# Patient Record
Sex: FEMALE | Race: OTHER | Employment: FULL TIME | URBAN - METROPOLITAN AREA
[De-identification: names, ages, dates, MRNs, and addresses within clinical notes are randomized per-mention and may not be internally consistent; named-entity substitution may affect disease eponyms.]

---

## 2024-04-07 ENCOUNTER — APPOINTMENT (OUTPATIENT)
Dept: GENERAL RADIOLOGY | Age: 54
End: 2024-04-07
Payer: COMMERCIAL

## 2024-04-07 ENCOUNTER — APPOINTMENT (OUTPATIENT)
Dept: CT IMAGING | Age: 54
End: 2024-04-07
Payer: COMMERCIAL

## 2024-04-07 ENCOUNTER — HOSPITAL ENCOUNTER (EMERGENCY)
Age: 54
Discharge: HOME | End: 2024-04-07
Payer: COMMERCIAL

## 2024-04-07 VITALS
OXYGEN SATURATION: 98 % | RESPIRATION RATE: 18 BRPM | HEIGHT: 64 IN | BODY MASS INDEX: 26.46 KG/M2 | DIASTOLIC BLOOD PRESSURE: 96 MMHG | TEMPERATURE: 98.3 F | SYSTOLIC BLOOD PRESSURE: 145 MMHG | HEART RATE: 80 BPM | WEIGHT: 155 LBS

## 2024-04-07 DIAGNOSIS — M54.50 LOW BACK PAIN, UNSPECIFIED BACK PAIN LATERALITY, UNSPECIFIED CHRONICITY, UNSPECIFIED WHETHER SCIATICA PRESENT: ICD-10-CM

## 2024-04-07 DIAGNOSIS — S52.121A CLOSED DISPLACED FRACTURE OF HEAD OF RIGHT RADIUS, INITIAL ENCOUNTER: Primary | ICD-10-CM

## 2024-04-07 PROCEDURE — 73080 X-RAY EXAM OF ELBOW: CPT

## 2024-04-07 PROCEDURE — 73090 X-RAY EXAM OF FOREARM: CPT

## 2024-04-07 PROCEDURE — 6370000000 HC RX 637 (ALT 250 FOR IP): Performed by: PHYSICIAN ASSISTANT

## 2024-04-07 PROCEDURE — 72110 X-RAY EXAM L-2 SPINE 4/>VWS: CPT

## 2024-04-07 PROCEDURE — 72131 CT LUMBAR SPINE W/O DYE: CPT

## 2024-04-07 PROCEDURE — 99284 EMERGENCY DEPT VISIT MOD MDM: CPT

## 2024-04-07 RX ORDER — HYDROCODONE BITARTRATE AND ACETAMINOPHEN 5; 325 MG/1; MG/1
1 TABLET ORAL ONCE
Status: COMPLETED | OUTPATIENT
Start: 2024-04-07 | End: 2024-04-07

## 2024-04-07 RX ORDER — HYDROCODONE BITARTRATE AND ACETAMINOPHEN 5; 325 MG/1; MG/1
1 TABLET ORAL EVERY 8 HOURS PRN
Qty: 6 TABLET | Refills: 0 | Status: SHIPPED | OUTPATIENT
Start: 2024-04-07 | End: 2024-04-09

## 2024-04-07 RX ADMIN — HYDROCODONE BITARTRATE AND ACETAMINOPHEN 1 TABLET: 5; 325 TABLET ORAL at 15:55

## 2024-04-07 ASSESSMENT — PAIN DESCRIPTION - ONSET: ONSET: PROGRESSIVE

## 2024-04-07 ASSESSMENT — PAIN DESCRIPTION - PAIN TYPE: TYPE: ACUTE PAIN

## 2024-04-07 ASSESSMENT — PAIN SCALES - GENERAL: PAINLEVEL_OUTOF10: 9

## 2024-04-07 ASSESSMENT — PAIN DESCRIPTION - LOCATION: LOCATION: ARM

## 2024-04-07 ASSESSMENT — LIFESTYLE VARIABLES
HOW MANY STANDARD DRINKS CONTAINING ALCOHOL DO YOU HAVE ON A TYPICAL DAY: PATIENT DOES NOT DRINK
HOW OFTEN DO YOU HAVE A DRINK CONTAINING ALCOHOL: NEVER

## 2024-04-07 ASSESSMENT — PAIN - FUNCTIONAL ASSESSMENT: PAIN_FUNCTIONAL_ASSESSMENT: 0-10

## 2024-04-07 ASSESSMENT — PAIN DESCRIPTION - ORIENTATION: ORIENTATION: RIGHT

## 2024-04-07 ASSESSMENT — PAIN DESCRIPTION - FREQUENCY: FREQUENCY: CONTINUOUS

## 2024-04-07 NOTE — ED PROVIDER NOTES
[facility] ED  eMERGENCY dEPARTMENT eNCOUnter      Pt Name: Yenny Avalos  MRN: [id number]  Birthdate 1970  Date of evaluation: 4/7/2024  Provider: Kristina Elizondo PA-C        HISTORY OF PRESENT ILLNESS    Yenny Avalos is a 53 y.o. female otherwise healthy; presenting to the ED for acute right elbow and forearm pain and left sided back pain that began yesterday after she fell while roller skating. Pain with movement of arm. Took motrin earlier. Apparently, was roller skating and fell, injured her arm and then fell while trying to get up and injured her back. Denies loss of bowel or bladder control, saddle anesthesia, BLE pain/weakness/paresthesia, foot drop bilaterally, neck pain, hitting her head, LOC, use of anticoagulants, cp, sob, abdominal pain, n/v/d, urinary sxs, parethesia or weakness of the right upper extremity.         REVIEW OF SYSTEMS       Review of Systems   Constitutional:  Negative for fever.   Respiratory:  Negative for shortness of breath.    Cardiovascular:  Negative for chest pain.   Gastrointestinal:  Negative for abdominal pain, nausea and vomiting.   Genitourinary: Negative.    Musculoskeletal:  Positive for arthralgias and back pain. Negative for neck pain.   Skin: Negative.    Neurological:  Negative for weakness and numbness.   All other systems reviewed and are negative.      Except as noted above the remainder of the review of systems was reviewed and negative.       PAST MEDICAL HISTORY   History reviewed. No pertinent past medical history.      SURGICAL HISTORY     History reviewed. No pertinent surgical history.      CURRENT MEDICATIONS       Previous Medications    No medications on file       ALLERGIES     Patient has no known allergies.    FAMILY HISTORY     History reviewed. No pertinent family history.       SOCIAL HISTORY       Social History     Tobacco Use    Smoking status: Never    Smokeless tobacco: Never   Vaping Use    Vaping Use: Every day    Substances:

## 2024-04-10 ENCOUNTER — OFFICE VISIT (OUTPATIENT)
Dept: ORTHOPEDIC SURGERY | Age: 54
End: 2024-04-10
Payer: COMMERCIAL

## 2024-04-10 VITALS — WEIGHT: 155 LBS | BODY MASS INDEX: 26.46 KG/M2 | HEIGHT: 64 IN | TEMPERATURE: 97.7 F

## 2024-04-10 DIAGNOSIS — S52.121A CLOSED DISPLACED FRACTURE OF HEAD OF RIGHT RADIUS, INITIAL ENCOUNTER: Primary | ICD-10-CM

## 2024-04-10 DIAGNOSIS — Z09 HOSPITAL DISCHARGE FOLLOW-UP: ICD-10-CM

## 2024-04-10 PROCEDURE — 1111F DSCHRG MED/CURRENT MED MERGE: CPT | Performed by: PHYSICIAN ASSISTANT

## 2024-04-10 PROCEDURE — 24650 CLTX RDL HEAD/NCK FX WO MNPJ: CPT | Performed by: PHYSICIAN ASSISTANT

## 2024-04-10 PROCEDURE — 99214 OFFICE O/P EST MOD 30 MIN: CPT | Performed by: PHYSICIAN ASSISTANT

## 2024-04-10 RX ORDER — HYDROCODONE BITARTRATE AND ACETAMINOPHEN 5; 325 MG/1; MG/1
1 TABLET ORAL EVERY 8 HOURS PRN
Qty: 21 TABLET | Refills: 0 | Status: SHIPPED | OUTPATIENT
Start: 2024-04-10 | End: 2024-04-17

## 2024-04-10 RX ORDER — HYDROCODONE BITARTRATE AND ACETAMINOPHEN 5; 325 MG/1; MG/1
1 TABLET ORAL EVERY 6 HOURS PRN
COMMUNITY
End: 2024-04-10 | Stop reason: SDUPTHER

## 2024-04-10 RX ORDER — CYCLOBENZAPRINE HCL 10 MG
10 TABLET ORAL 2 TIMES DAILY PRN
Qty: 20 TABLET | Refills: 0 | Status: SHIPPED | OUTPATIENT
Start: 2024-04-10 | End: 2024-04-20

## 2024-04-10 ASSESSMENT — ENCOUNTER SYMPTOMS
GASTROINTESTINAL NEGATIVE: 1
RESPIRATORY NEGATIVE: 1
EYES NEGATIVE: 1

## 2024-04-10 NOTE — PROGRESS NOTES
Yenny Avalos (:  1970) is a 53 y.o. female,New patient, here for evaluation of the following chief complaint(s):  Follow-Up from Hospital (4.6.24 fell with skating/States having pain in the right arm and lower left back pain)         ASSESSMENT/PLAN:  1. Closed displaced fracture of head of right radius, initial encounter  -     HYDROcodone-acetaminophen (NORCO) 5-325 MG per tablet; Take 1 tablet by mouth every 8 hours as needed for Pain for up to 7 days. Max Daily Amount: 3 tablets, Disp-21 tablet, R-0Normal  2. Hospital discharge follow-up  -     TN DISCHARGE MEDS RECONCILED W/ CURRENT OUTPATIENT MED LIST      Return in 1 week (on 2024).         Subjective   SUBJECTIVE/OBJECTIVE:  Is a 53-year-old right-hand-dominant female complaining of right elbow pain.  She states 3 days prior she was on roller skates and fell landing on her outstretched right arm.  X-rays in the emergency department showed a minimally displaced fracture of the radial head.  Patient is also complaining of lower back pain.  She denies bowel or bladder dysfunction.  She denies any radiation of the pain down the legs.        Review of Systems   Constitutional: Negative.    HENT: Negative.     Eyes: Negative.    Respiratory: Negative.     Gastrointestinal: Negative.    Genitourinary: Negative.    Musculoskeletal: Negative.    Psychiatric/Behavioral: Negative.            Objective   EXAMINATION:  FOUR XRAY VIEWS OF THE RIGHT ELBOW; THREE XRAY VIEWS RIGHT FOREARM     2024 4:03 pm     COMPARISON:  None.     HISTORY:  ORDERING SYSTEM PROVIDED HISTORY: right elbow pain  TECHNOLOGIST PROVIDED HISTORY:  Reason for exam:->right elbow pain  What reading provider will be dictating this exam?->CRC     FINDINGS:  Right elbow: Acute, mildly displaced intra-articular fracture of the radial  head and neck.  Associated displacement of the distal humeral fat pads  compatible with joint hemarthrosis.  Intact ulna and distal right

## 2024-04-17 ENCOUNTER — HOSPITAL ENCOUNTER (OUTPATIENT)
Dept: ORTHOPEDIC SURGERY | Age: 54
Discharge: HOME | End: 2024-04-19
Payer: COMMERCIAL

## 2024-04-17 ENCOUNTER — OFFICE VISIT (OUTPATIENT)
Dept: ORTHOPEDIC SURGERY | Age: 54
End: 2024-04-17

## 2024-04-17 DIAGNOSIS — S52.121D CLOSED DISPLACED FRACTURE OF HEAD OF RIGHT RADIUS WITH ROUTINE HEALING, SUBSEQUENT ENCOUNTER: ICD-10-CM

## 2024-04-17 DIAGNOSIS — S52.121D CLOSED DISPLACED FRACTURE OF HEAD OF RIGHT RADIUS WITH ROUTINE HEALING, SUBSEQUENT ENCOUNTER: Primary | ICD-10-CM

## 2024-04-17 PROCEDURE — 73080 X-RAY EXAM OF ELBOW: CPT

## 2024-04-17 PROCEDURE — 99024 POSTOP FOLLOW-UP VISIT: CPT | Performed by: PHYSICIAN ASSISTANT

## 2024-04-17 ASSESSMENT — ENCOUNTER SYMPTOMS
RESPIRATORY NEGATIVE: 1
GASTROINTESTINAL NEGATIVE: 1
EYES NEGATIVE: 1

## 2024-04-17 NOTE — PROGRESS NOTES
Yenny Avalos (:  1970) is a 53 y.o. female,Established patient, here for evaluation of the following chief complaint(s):  Cast Removal, Fracture, and Elbow Pain (Patient fell April 10, 2024 fracturing her right radial head.  She has been in a long-arm cast or splint since her injury date.  She presents today for repeat x-rays)      Assessment & Plan   1. Closed displaced fracture of head of right radius with routine healing, subsequent encounter  -     XR ELBOW RIGHT (MIN 3 VIEWS); Future  -     Ambulatory referral to Physical Therapy      Return in about 3 weeks (around 2024).       Subjective   This is a 53-year-old right-hand-dominant female following up for complaint of right elbow pain after a fall sustained 10 days ago.  She has a minimally displaced fracture of the radial head.  She was in a long-arm cast.  She states her pain is improving however she still has difficulty moving her arm secondary to pain.  She denies change in sensation of the hand or the arm.        Review of Systems   Constitutional: Negative.    HENT: Negative.     Eyes: Negative.    Respiratory: Negative.     Gastrointestinal: Negative.    Genitourinary: Negative.    Musculoskeletal: Negative.    Psychiatric/Behavioral: Negative.            Objective   Physical Exam  Musculoskeletal:      Comments: Right elbow-soft tissue swelling.  No break in the skin.  Extension is lacking 10 degrees, flexion to 120 degrees.  The elbow joint is stable, there is no laxity with radial or ulnar stress.  Pronation and supination range of motion is minimally decreased.  Improving tenderness with palpation directly over the radial head.  Sensation is intact distally to light touch.     I explained to the patient the x-rays show no migration of the radial head fracture.  I like her to come out of the cast and just continue in the sling.  Did like her to come out of the sling and work on range of motion exercises.  We discussed pronating and

## 2024-04-23 DIAGNOSIS — S52.121D CLOSED DISPLACED FRACTURE OF HEAD OF RIGHT RADIUS WITH ROUTINE HEALING, SUBSEQUENT ENCOUNTER: Primary | ICD-10-CM

## 2024-04-26 ENCOUNTER — HOSPITAL ENCOUNTER (OUTPATIENT)
Dept: OCCUPATIONAL THERAPY | Age: 54
Setting detail: THERAPIES SERIES
Discharge: HOME | End: 2024-04-26
Payer: COMMERCIAL

## 2024-04-26 PROCEDURE — 97166 OT EVAL MOD COMPLEX 45 MIN: CPT

## 2024-04-26 NOTE — THERAPY EVALUATION
[facility]   [facility] [address]  Dept: [phone]  Dept Fax: [phone]  Loc: [phone]    OCCUPATIONAL THERAPY EVALUATION    Occupational Therapy: Initial evaluation    Patient: Yenny Avalos (53 y.o. female)   Evaluation Date: 2024   :  1970  MRN: [id number]  CSN: [id number]  Account #: [id number]   Insurance: Payor: [facility] / Plan: [facility] MEDICAID / Product Type: *No Product type* /   Insurance ID: [id number] - (Medicaid Managed) Secondary Insurance (if applicable):    Referring Physician: Cristobal Thompson PA     PCP: Chad Crum MD  REFERRAL DATE: 24  Onset Date: 2024   Visits to Date: Total # of Visits to Date: 1  Visits Approved:  (eval only)    Medical Diagnosis: Closed displaced fracture of head of right radius with routine healing, subsequent encounter [S52.121D] Closed displaced fracture of head of right radius     Treating diagnosis: R29.898 Other symptoms and signs involving the musculoskeletal systems (decreased AROM, dexterity, and strength )       Therapy Time    Time in 1045   Time out 1130   Total treatment minutes 45   Total time code minutes         Session started when pt arrived late     OT Eval mod complexity 45 minutes for 1 unit, CPT 45850    PERTINENT MEDICAL HISTORY     PMH:  There is no problem list on file for this patient.    No past medical history on file.  No past surgical history on file.  No Known Allergies    Diagnostic imaging: Physician interpretation of imaging tests reviewed.    Medications:  No current outpatient medications on file.    Restrictions: none reported, per PA note on 4/10/24 \"Will begin gentle range of motion with no weights at her next visit.\"              SUBJECTIVE EXAMINATION     Patient's date of birth confirmed: Yes     Transfer of pt care required: no, pt able to work with ALANIZ or other OTR     Support contact: SO    English

## 2024-05-01 ENCOUNTER — HOSPITAL ENCOUNTER (OUTPATIENT)
Dept: OCCUPATIONAL THERAPY | Age: 54
Setting detail: THERAPIES SERIES
Discharge: HOME | End: 2024-05-01
Payer: COMMERCIAL

## 2024-05-01 PROCEDURE — 97530 THERAPEUTIC ACTIVITIES: CPT

## 2024-05-01 PROCEDURE — 97140 MANUAL THERAPY 1/> REGIONS: CPT

## 2024-05-01 NOTE — PROGRESS NOTES
[facility]CC[facility]ER[facility]       Occupational Therapy: Daily Note   Patient: Yenny Avalos (53 y.o. female)   Date: 2024  Plan of Care Certification Period:  24   :  1970  MRN: [id number]  CSN: [id number]   Insurance: Payor: [facility] / Plan: [facility] MEDICAID / Product Type: *No Product type* /   Insurance ID: [id number] - (Medicaid Managed) Secondary Insurance (if applicable):    Referring Physician: Cristobal Thompson PA     PCP: Chad Crum MD Visits to Date: Total # of Visits to Date: 1 (plus 1 eval)   Progress note:Progress Note Counter:   Visits Approved:  (eval only)    No Show: 0  Cancelled Appts:0     Medical Diagnosis: Closed displaced fracture of head of right radius with routine healing, subsequent encounter [S58.212U] Closed displaced fracture of head of right radius        Therapy Time     Time in 1100   Time out 1200   Total treatment minutes 60   Total time code minutes  60 Minutes        OT Manual therapy 10 minutes for 1 unit(s), CPT 79172  OT Therapeutic activities 50 minutes for 3 unit(s), CPT 94602       SUBJECTIVE EXAMINATION     Patient's date of birth confirmed: Yes     Pain Level:   Pt denies pain  Location: R elbow   Description: \"no numbness or tingling\"     Patient Comments:   \"We got my car fixed, so I shouldn't be late anymore.\"          Learning/Language barrier: no,        HEP/Strategies/Orthosis Compliance: N/A          Restrictions: none reported, per PA note on 4/10/24 \"Will begin gentle range of motion with no weights at her next visit.\"     Pt reported she did not call her PA to follow up. Therapist did not find any additional notes or clinical documentation clarifying restrictions. Will follow up before next visit.            OBJECTIVE EXAMINATION     TREATMENT     Focus of treatment was on the following:   decreasing fascial/soft tissue restrictions and increase right UE active ROM     MFR/Manual:   Pt treated seated on chair for

## 2024-05-08 ENCOUNTER — TELEPHONE (OUTPATIENT)
Dept: ORTHOPEDIC SURGERY | Age: 54
End: 2024-05-08

## 2024-05-08 ENCOUNTER — HOSPITAL ENCOUNTER (OUTPATIENT)
Dept: OCCUPATIONAL THERAPY | Age: 54
Setting detail: THERAPIES SERIES
Discharge: HOME | End: 2024-05-08
Payer: COMMERCIAL

## 2024-05-08 PROCEDURE — 97530 THERAPEUTIC ACTIVITIES: CPT

## 2024-05-08 NOTE — PROGRESS NOTES
[facility]       Occupational Therapy: Daily Note   Patient: Yenny Avalos (53 y.o. female)   Date: 2024  Plan of Care Certification Period:  24   :  1970  MRN: [id number]  CSN: [id number]   Insurance: Payor: [facility] / [facility] [place] [facility] / Product Type: *No Product type* /   Insurance ID: [id number] - (Medicaid Managed) Secondary Insurance (if applicable):    Referring Physician: Cristobal Thompson PA     PCP: Chad Crum MD Visits to Date: Total # of Visits to Date: 2 (plus 1 eval)   Progress note:Progress Note Counter:   Visits Approved:  (eval only)    No Show: 1  Cancelled Appts:0     Medical Diagnosis: Closed displaced fracture of head of right radius with routine healing, subsequent encounter [S52.533D] Closed displaced fracture of head of right radius        Therapy Time     Time in 1103   Time out 1156   Total treatment minutes 53   Total time code minutes  53 Minutes        OT Therapeutic activities 53 minutes for 4 unit(s), CPT 25558       SUBJECTIVE EXAMINATION     Patient's date of birth confirmed: Yes     Pain Level:   Pt denies pain  Location: n/a  Description: n/a  Comments: Pt reported only having achy pain when attempting supination. Pain is in dorsal and volar aspect of wrist. Pain radiates on radial side of forearm.      Patient Comments:   \"I have an appointment with my doctor tomorrow.\" Pt stated she is ready to go back to work.          Learning/Language barrier: no       HEP/Strategies/Orthosis Compliance: Patient verbally confirmed compliant with HEP's Pt stated has not attempted dry heat at home.           Restrictions: Pt stated she does not think that she has any restrictions. Per PA note on 4/10/24 \"Will begin gentle range of motion with no weights at her next visit.\" Pt stated has not contacted physician office to clarify, but will ask tomorrow at appointment.            OBJECTIVE EXAMINATION         TREATMENT     Focus

## 2024-05-09 ENCOUNTER — OFFICE VISIT (OUTPATIENT)
Dept: ORTHOPEDIC SURGERY | Age: 54
End: 2024-05-09

## 2024-05-09 VITALS
HEIGHT: 64 IN | WEIGHT: 180 LBS | TEMPERATURE: 97.4 F | HEART RATE: 89 BPM | OXYGEN SATURATION: 99 % | BODY MASS INDEX: 30.73 KG/M2

## 2024-05-09 DIAGNOSIS — S52.121D CLOSED DISPLACED FRACTURE OF HEAD OF RIGHT RADIUS WITH ROUTINE HEALING, SUBSEQUENT ENCOUNTER: Primary | ICD-10-CM

## 2024-05-09 PROCEDURE — 99024 POSTOP FOLLOW-UP VISIT: CPT | Performed by: PHYSICIAN ASSISTANT

## 2024-05-09 RX ORDER — HYDROCODONE BITARTRATE AND ACETAMINOPHEN 5; 325 MG/1; MG/1
1 TABLET ORAL 2 TIMES DAILY PRN
Qty: 14 TABLET | Refills: 0 | Status: SHIPPED | OUTPATIENT
Start: 2024-05-09 | End: 2024-05-16

## 2024-05-09 ASSESSMENT — ENCOUNTER SYMPTOMS
RESPIRATORY NEGATIVE: 1
EYES NEGATIVE: 1
GASTROINTESTINAL NEGATIVE: 1

## 2024-05-09 NOTE — PROGRESS NOTES
Yenny Avalos (:  1970) is a 53 y.o. female,Established patient, here for evaluation of the following chief complaint(s):  Follow-up (Follow up of right elbow fracture. DOI: 4.6.24 No pain at rest. 6/10 at its worst.)      Assessment & Plan   1. Closed displaced fracture of head of right radius with routine healing, subsequent encounter      No follow-ups on file.       Subjective   Is a 53-year-old right-hand-dominant female following up for complaint of right elbow pain secondary to radial head fracture.  She is 1 month status post fracture date.  She has attended 3 visits to occupational therapy.  She notices decreased range of motion.  She states she has been taking her pain medication sparingly but needs them to advance her movement while in therapy.  She denies change in sensation of the right hand.  She does not feel capable to return to work at this time.        Review of Systems   Constitutional: Negative.    HENT: Negative.     Eyes: Negative.    Respiratory: Negative.     Gastrointestinal: Negative.    Genitourinary: Negative.    Musculoskeletal: Negative.    Psychiatric/Behavioral: Negative.            Objective   Physical Exam  Constitutional:       Appearance: Normal appearance.   HENT:      Head: Normocephalic and atraumatic.      Mouth/Throat:      Mouth: Mucous membranes are moist.   Eyes:      Extraocular Movements: Extraocular movements intact.   Musculoskeletal:      Cervical back: Normal range of motion.      Comments: Right elbow-extension is lacking 10 degrees, flexion is 115 degrees.  Little decrease in pronation and supination of the right wrist.  Radial head is nontender with palpation.  Sensation is intact distally to light touch.   Skin:     General: Skin is warm and dry.   Neurological:      General: No focal deficit present.      Mental Status: She is oriented to person, place, and time.   Psychiatric:         Mood and Affect: Mood normal.     Explained to the patient that

## 2024-05-10 ENCOUNTER — HOSPITAL ENCOUNTER (OUTPATIENT)
Dept: OCCUPATIONAL THERAPY | Age: 54
Setting detail: THERAPIES SERIES
Discharge: HOME | End: 2024-05-10
Payer: COMMERCIAL

## 2024-05-10 PROCEDURE — 97530 THERAPEUTIC ACTIVITIES: CPT

## 2024-05-10 PROCEDURE — 97140 MANUAL THERAPY 1/> REGIONS: CPT

## 2024-05-10 NOTE — PROGRESS NOTES
[facility]       Occupational Therapy: Daily Note   Patient: Yenny Avalos (53 y.o. female)   Date: 05/10/2024  Plan of Care Certification Period:  24   :  1970  MRN: [id number]  CSN: [id number]   Insurance: Payor: [facility] / Plan: [facility] MEDICAID / Product Type: *No Product type* /   Insurance ID: [id number] - (Medicaid Managed) Secondary Insurance (if applicable):    Referring Physician: Cristobal Thompson PA     PCP: Chad Crum MD Visits to Date: Total # of Visits to Date: 3 (plus 1 eval)   Progress note:Progress Note Counter: 3/12  Visits Approved:  (eval only)    No Show: 1  Cancelled Appts:0     Medical Diagnosis: Closed displaced fracture of head of right radius with routine healing, subsequent encounter [S50.616W] Closed displaced fracture of head of right radius        Therapy Time     Time in 1032   Time out 1125   Total treatment minutes 53   Total time code minutes  53 Minutes        OT Manual therapy 8 minutes for 1 unit(s), CPT 17271  OT Therapeutic activities 45 minutes for 3 unit(s), CPT 11321       SUBJECTIVE EXAMINATION     Patient's date of birth confirmed: Yes     Pain Level:   Pt denies pain at rest   Location: n/a  Description: n/a    Patient Comments:   Pt stated would like to work on elbow extension and supination on this date.          Learning/Language barrier: no       HEP/Strategies/Orthosis Compliance: Patient verbally confirmed compliant with HEP's, \"becomes a little painful after awhile.\"           Restrictions: Per message from Wei Thompson on 24,  \"Ms. Avalos can begin little more aggressive range of motion exercises.  Her fracture is stable enough...\"           OBJECTIVE EXAMINATION         TREATMENT     Focus of treatment was on the following:   decreasing fascial/soft tissue restrictions, decreasing pain, education/training, and increase right UE active ROM     MFR/Manual:   Pt treated seated on chair  Upper extremity:

## 2024-05-22 ENCOUNTER — HOSPITAL ENCOUNTER (OUTPATIENT)
Dept: OCCUPATIONAL THERAPY | Age: 54
Setting detail: THERAPIES SERIES
Discharge: HOME | End: 2024-05-22
Payer: COMMERCIAL

## 2024-05-22 PROCEDURE — 97140 MANUAL THERAPY 1/> REGIONS: CPT

## 2024-05-22 PROCEDURE — 97530 THERAPEUTIC ACTIVITIES: CPT

## 2024-05-22 NOTE — PROGRESS NOTES
[facility]       Occupational Therapy: Daily Note   Patient: Yenny Avalos (53 y.o. female)   Date: 2024  Plan of Care Certification Period:  24   :  1970  MRN: [id number]  CSN: [id number]   Insurance: Payor: [facility] / [facility] [place] [facility] / Product Type: *No Product type* /   Insurance ID: [id number] - (Medicaid Managed) Secondary Insurance (if applicable):    Referring Physician: Cristobal Thompson PA     PCP: Chad Crum MD Visits to Date: Total # of Visits to Date: 4 (plus 1 eval)   Progress note:Progress Note Counter:   Visits Approved:  (eval only)    No Show: 1  Cancelled Appts:0     Medical Diagnosis: Closed displaced fracture of head of right radius with routine healing, subsequent encounter [S57.721T] Closed displaced fracture of head of right radius        Therapy Time     Time in 1100   Time out 1200   Total treatment minutes 60   Total time code minutes  60 Minutes        OT Manual therapy 10 minutes for 1 unit(s), CPT 99885  OT Therapeutic activities 50 minutes for 3 unit(s), CPT 60502       SUBJECTIVE EXAMINATION     Patient's date of birth confirmed: Yes     Pain Level:   Pt denies pain  Location: R posterior elbow radiating up the upper arm   Description: sore    Patient Comments:   \"I didn't have therapy last week.\"        Learning/Language barrier: no,        HEP/Strategies/Orthosis Compliance: Patient verbally confirmed compliant with HEP's Patient demo understanding. Pt reports the stretches sometimes makes her arm feel more stiff and sore.        Restrictions: Per message from Wei Thompson on 24,  \"Ms. Avalos can begin little more aggressive range of motion exercises.  Her fracture is stable enough...\"            OBJECTIVE EXAMINATION       TREATMENT     Focus of treatment was on the following:  Improving R elbow and forearm function, decreasing fascial/soft tissue restrictions and pain.      MFR/Manual:  Pt treated seated

## 2024-05-24 ENCOUNTER — HOSPITAL ENCOUNTER (OUTPATIENT)
Dept: OCCUPATIONAL THERAPY | Age: 54
Setting detail: THERAPIES SERIES
Discharge: HOME | End: 2024-05-24
Payer: COMMERCIAL

## 2024-05-24 PROCEDURE — 97530 THERAPEUTIC ACTIVITIES: CPT

## 2024-05-24 NOTE — PROGRESS NOTES
[facility]       Occupational Therapy: Daily Note   Patient: Yenny Avalos (53 y.o. female)   Date: 2024  Plan of Care Certification Period:      :  1970  MRN: [id number]  CSN: [id number]   Insurance: Payor: [facility] / [facility] [place] [facility] / Product Type: *No Product type* /   Insurance ID: [id number] - (Medicaid Managed) Secondary Insurance (if applicable):    Referring Physician: Cristobal Thompson PA     PCP: Chad Crum MD Visits to Date:   Total # of visits to date: 5 (plus 1 eval)  Progress note:    Visits Approved:    12 (eval only)  No Show:  1  Cancelled Appts:   0   Medical Diagnosis: Closed displaced fracture of head of right radius with routine healing, subsequent encounter [S55.766N] Closed displaced fracture of head of right radius        Therapy Time ***    Time in     Time out     Total treatment minutes     Total time code minutes           {time:36464}       SUBJECTIVE EXAMINATION     Patient's date of birth confirmed: {Birthday Verify:[birth year]}     Pain Level:   {paina:40132}  Location: ***  Description: ***    Patient Comments:   ***         Learning/Language barrier: no,        HEP/Strategies/Orthosis Compliance: {ptr:62532}          Restrictions: Per message from Wei Thompson on 24,  \"Ms. Avalos can begin little more aggressive range of motion exercises.  Her fracture is stable enough...\"            OBJECTIVE EXAMINATION         TREATMENT     Focus of treatment was on the following:   {focusoftreatment:63214}     MFR/Manual:  Pt treated seated on chair for soft tissue mobilization on the entire R forearm, focusing on the interosseous membrane to improve supination ROM followed by cross friction massage to the posterior elbow.      Exercises/Activities:  AROM forearm stretches with distal UE supported on ball on table x 10 reps:  -elbow flexion/extension with hold in extension x 5 sec  -elbow extended, forearm supination/pronation with 5 
  Wrist              Flexion 4/5 70° NT 0-70 5/5 80* NT   Extension  4/5 60° NT 0-60 5/5 55* NT   Ulnar deviation 3+/5 WFL 30 0-30 NT NT NT   Radial Deviation  3/5 WFL 5 0-20 NT NT NT   Comments: sharp pain w/ supination test. 75* supination achieved after 20 minutes of fluidotherapy and -10* elbow extension achieved after 20 minutes of fluidotherapy.    Edema  Circumferential measurements cm or inches     Right Eval Right 5/24 Left   Wrist (across styloid) 6 1/2\" 7 1/2\" 6 1/4\"    Metacarpal Phalangeal 7 5/8\" 7 3/4\" 7 1/4\"      & Pinch Strength  Average of 3 tries Right Eval Right 5/24 Norm    (lb) 20 52.7 Female age 50-54: 53 lbs   [place] Pinch (lb) 6.5 12.8 Female age 50-54: 11.5 lbs   Lateral Pinch (lb) 11.6 16.7 Female age 50-54: 12.0 lbs   Comments:     Coordination & Dexterity    Right Eval Right 5/24 Norm   Nine Hole Peg Test  (seconds) 25.1 19.01 Female age 50-54: 18.0 s   Comments:     Patient Education/HEP:   Continue recommended HEP/activities.         ASSESSMENT       Assessment:  Pt tolerated treatment well. Pt has met strength and dexterity goals and is progressing towards pain and mobility goals. Pt will benefit from continued OT to increase progress towards goals to increase participation in daily activities.          Post Treatment Pain: Pt denies pain    Patient's Activity Tolerance: Pt tolerated treatment well                 GOALS         Long Term Goals  Long Term Goal 1: Patient will report pain 4/10 or less during functional activities.  Long Term Goal 2: Patient  will be IND with all recommended HEP's, adaptive strategies, and adaptive techniques.  Long Term Goal 3: Patient will report decreased frequency of numbness/tingling in RUE.  Long Term Goal 4: Patient  will decrease edema in R hand to increase  performance with I/ADL's.  Long Term Goal 5: Patient will increase AROM of R elbow from current by 10-15° to increase performance with I/ADL's  Long Term Goal 6: Patient  will increase

## 2024-05-24 NOTE — PLAN OF CARE
Cristobal Thompson PA        Physician Comments: _______________________________________________    Please sign and return to [facility].  Please fax to the location listed below. THANK YOU for this referral!          If you have any questions or concerns, please don't hesitate to call.  Thank you for your referral!

## 2024-05-29 ENCOUNTER — HOSPITAL ENCOUNTER (OUTPATIENT)
Dept: OCCUPATIONAL THERAPY | Age: 54
Setting detail: THERAPIES SERIES
Discharge: HOME | End: 2024-05-29
Payer: COMMERCIAL

## 2024-05-29 PROCEDURE — 97530 THERAPEUTIC ACTIVITIES: CPT

## 2024-05-29 NOTE — PROGRESS NOTES
[facility]       Occupational Therapy: Daily Note   Patient: Yenny Avalos (53 y.o. female)   Date: 2024  Plan of Care Certification Period:  24   :  1970  MRN: [id number]  CSN: [id number]   Insurance: Payor: [facility] / [facility] [place] [facility] / Product Type: *No Product type* /   Insurance ID: [id number] - (Medicaid Managed) Secondary Insurance (if applicable):    Referring Physician: Cristobal Thompson PA     PCP: Chad Crum MD Visits to Date: Total # of Visits to Date: 6 (plus 1 eval)   Progress note:Progress Note Counter:   Visits Approved: 12    No Show: 1  Cancelled Appts:0     Medical Diagnosis: Closed displaced fracture of head of right radius with routine healing, subsequent encounter [S52.121D] Closed displaced fracture of head of right radius        Therapy Time     Time in 1100   Time out 1200   Total treatment minutes 60   Total time code minutes  60 Minutes        OT Therapeutic activities 60 minutes for 4 unit(s), CPT 03969       SUBJECTIVE EXAMINATION     Patient's date of birth confirmed: Yes     Pain Level:   Pt denies pain  Location: R elbow   Description: stiff    Patient Comments:   \"I can brush my hair, and clean myself after I use the bathroom.\"          Learning/Language barrier: no,        HEP/Strategies/Orthosis Compliance: Patient verbally confirmed compliant with HEP's          Restrictions: Per OTR Wm, referring provider messaged OTR in-basket stating, \"her fracture is stable enough that she can withstand anything you throw at her.\"            OBJECTIVE EXAMINATION     TREATMENT     Focus of treatment was on the following:   decreasing fascial/soft tissue restrictions and improving RUE function       Exercises/Activities:  R elbow stretch then 10 reps of AROM:  -Supine Elbow Extension Stretch - Supination  -Supine Elbow Extension Stretch - Pronation  -Standing Elbow Extension with Towel Roll at Wall - Neutral     Yellow

## 2024-05-30 ENCOUNTER — OFFICE VISIT (OUTPATIENT)
Dept: ORTHOPEDIC SURGERY | Age: 54
End: 2024-05-30

## 2024-05-30 VITALS
HEIGHT: 64 IN | WEIGHT: 183 LBS | TEMPERATURE: 97.2 F | BODY MASS INDEX: 31.24 KG/M2 | OXYGEN SATURATION: 96 % | HEART RATE: 81 BPM

## 2024-05-30 DIAGNOSIS — S52.121D CLOSED DISPLACED FRACTURE OF HEAD OF RIGHT RADIUS WITH ROUTINE HEALING, SUBSEQUENT ENCOUNTER: Primary | ICD-10-CM

## 2024-05-30 PROCEDURE — 99024 POSTOP FOLLOW-UP VISIT: CPT | Performed by: PHYSICIAN ASSISTANT

## 2024-05-30 ASSESSMENT — ENCOUNTER SYMPTOMS
EYES NEGATIVE: 1
GASTROINTESTINAL NEGATIVE: 1
RESPIRATORY NEGATIVE: 1

## 2024-05-30 NOTE — PROGRESS NOTES
well as documenting on the day of the visit.      An electronic signature was used to authenticate this note.    --CLAY Ray

## 2024-05-31 ENCOUNTER — HOSPITAL ENCOUNTER (OUTPATIENT)
Dept: OCCUPATIONAL THERAPY | Age: 54
Setting detail: THERAPIES SERIES
Discharge: HOME | End: 2024-05-31
Payer: COMMERCIAL

## 2024-05-31 PROCEDURE — 97530 THERAPEUTIC ACTIVITIES: CPT

## 2024-05-31 NOTE — PROGRESS NOTES
[facility]       Occupational Therapy: Daily Note   Patient: Yenny Avalos (53 y.o. female)   Date: 2024  Plan of Care Certification Period:  24   :  1970  MRN: [id number]  CSN: [id number]   Insurance: Payor: [facility] / Plan: [employer] [place] MEDICA[place] / Product Type: *No Product type* /   Insurance ID: [id number] - (Medicaid Managed) Secondary Insurance (if applicable):    Referring Physician: Cristobal Thompson PA     PCP: Chad Crum MD Visits to Date: Total # of Visits to Date: 7 (plus evaluation)   Progress note:Progress Note Counter:   Visits Approved: 12    No Show: 1  Cancelled Appts:0     Medical Diagnosis: Closed displaced fracture of head of right radius with routine healing, subsequent encounter [S52.121D] Closed displaced fracture of head of right radius        Therapy Time     Time in 1300   Time out 1400   Total treatment minutes 60   Total time code minutes  60 Minutes        OT Therapeutic activities 60 minutes for 4 unit(s), CPT 17213       SUBJECTIVE EXAMINATION     Patient's date of birth confirmed: Yes     Pain Level:   Pt denies pain      Patient Comments:   Patient states she does not have pain at rest; however, increased soreness with movement and exercise.        Learning/Language barrier: No       HEP/Strategies/Orthosis Compliance: Patient verbally confirmed compliant with HEP's          Restrictions: Per OTR Wm, referring provider messaged OTR in-basket stating, \"her fracture is stable enough that she can withstand anything you throw at her.\"            OBJECTIVE EXAMINATION         TREATMENT     Focus of treatment was on the following:   ROM and strength     Patient requests to start session with fluidotherapy to warm up prior to completing activities. Patient tolerates x20 minutes while performing light AROM to wrist/forearm.     Patient engages in [employer] UBE for increased elbow flexion/extension. Patient tolerates x3 minutes

## 2024-06-05 ENCOUNTER — HOSPITAL ENCOUNTER (OUTPATIENT)
Dept: OCCUPATIONAL THERAPY | Age: 54
Setting detail: THERAPIES SERIES
Discharge: HOME | End: 2024-06-05
Payer: COMMERCIAL

## 2024-06-05 PROCEDURE — 97530 THERAPEUTIC ACTIVITIES: CPT

## 2024-06-05 NOTE — PROGRESS NOTES
[facility]       Occupational Therapy: Daily Note   Patient: Yenny Avalos (53 y.o. female)   Date: 2024  Plan of Care Certification Period:  24   :  1970  MRN: [id number]  CSN: [id number]   Insurance: Payor: [facility] / Plan: [employer] [place] MEDICA[place] / Product Type: *No Product type* /   Insurance ID: [id number] - (Medicaid Managed) Secondary Insurance (if applicable):    Referring Physician: Cristobal Thompson PA     PCP: Chad Crum MD Visits to Date: Total # of Visits to Date: 8 (plus evaluation)   Progress note:Progress Note Counter:   Visits Approved: 12    No Show: 1  Cancelled Appts:0     Medical Diagnosis: Closed displaced fracture of head of right radius with routine healing, subsequent encounter [S52.121D] Closed displaced fracture of head of right radius        Therapy Time     Time in 1103   Time out 1200   Total treatment minutes 57   Total time code minutes  57 Minutes        OT Therapeutic activities 57 minutes for 4 unit(s), CPT 97966       SUBJECTIVE EXAMINATION     Patient's date of birth confirmed: Yes     Pain Level:   Pt denies pain  Location: R upper arm, elbow  Description: \"sore, irritated\"     Patient Comments:   \"I worked yesterday for the first time. It was kind of painful, but not as much as I anticipated.\"         Learning/Language barrier: no,        HEP/Strategies/Orthosis Compliance: Patient verbally confirmed compliant with HEP's          Restrictions: Per OTR Wm, referring provider messaged OTR in-basket stating, \"her fracture is stable enough that she can withstand anything you throw at her.\"            OBJECTIVE EXAMINATION     TREATMENT     Focus of treatment was on the following:   Improving RUE ROM and endurance while managing pain      Exercises/Activities:  AAROM with towel on table x 10 reps:  -shoulder flexion/extension with wrist extension at end range  -shoulder horiztonal abduction elbow extended, forearm

## 2024-06-07 ENCOUNTER — HOSPITAL ENCOUNTER (OUTPATIENT)
Dept: OCCUPATIONAL THERAPY | Age: 54
Setting detail: THERAPIES SERIES
Discharge: HOME | End: 2024-06-07
Payer: COMMERCIAL

## 2024-06-07 NOTE — PROGRESS NOTES
Therapy                            Cancellation/No-show Note    Date: 2024  Patient Name: Yenny Avalos    : 1970  (53 y.o.)     MRN: [id number]    Account #: [id number]    No Show: 1  Canceled Appointment: 1    Comments:      For today's appointment patient:  [x]  Cancelled  []  Rescheduled appointment  []  No-show   []  Called pt to remind of next appointment     Reason given by patient:  []  Patient ill  []  Conflicting appointment  []  No transportation    []  Conflict with work  []  No reason given  [x]  Other:  Unable to come.     [x] Pt has future appointments scheduled, no follow up needed  [] Pt requests to be on hold.    Reason:   If > 2 weeks please discuss with therapist.  [] Therapist to call pt for follow up     Signature: JLUIS Harrington 2024 10:09 AM

## 2024-06-11 ENCOUNTER — HOSPITAL ENCOUNTER (OUTPATIENT)
Dept: OCCUPATIONAL THERAPY | Age: 54
Setting detail: THERAPIES SERIES
Discharge: HOME | End: 2024-06-11
Payer: COMMERCIAL

## 2024-06-11 NOTE — PROGRESS NOTES
Therapy                            Cancellation/No-show Note    Date: 2024  Patient Name: Yenny Avalos    : 1970  (53 y.o.)     MRN: [id number]    Account #: [id number]    No Show: 2  Canceled Appointment: 1    Comments:      For today's appointment patient:  []  Cancelled  []  Rescheduled appointment  [x]  No-show   []  Called pt to remind of next appointment     Reason given by patient:  []  Patient ill  []  Conflicting appointment  []  No transportation    []  Conflict with work  []  No reason given  [x]  Other:      [] Pt has future appointments scheduled, no follow up needed  [] Pt requests to be on hold.    Reason:   If > 2 weeks please discuss with therapist.  [x] Therapist to call pt for follow up     Signature: JULITO Malloy/JUDITH 2024 2:54 PM

## 2024-06-14 ENCOUNTER — HOSPITAL ENCOUNTER (OUTPATIENT)
Dept: OCCUPATIONAL THERAPY | Age: 54
Setting detail: THERAPIES SERIES
Discharge: HOME | End: 2024-06-14
Payer: COMMERCIAL

## 2024-06-14 NOTE — PROGRESS NOTES
Therapy                            Cancellation/No-show Note    Date: 2024  Patient Name: Yenny Avalos    : 1970  (53 y.o.)     MRN: [id number]    Account #: [id number]    No Show: 3  Canceled Appointment: 1    Comments:      For today's appointment patient:  []  Cancelled  []  Rescheduled appointment  [x]  No-show   [x]  Called pt to remind of next appointment     Reason given by patient:  []  Patient ill  []  Conflicting appointment  []  No transportation    []  Conflict with work  []  No reason given  [x]  Other: pt stated unable to leave work, pt stated needs to have appointment times changed, pt stated will discuss on Wednesday to try to change future appointments      [x] Pt has future appointments scheduled, no follow up needed  [] Pt requests to be on hold.    Reason:   If > 2 weeks please discuss with therapist.  [] Therapist to call pt for follow up     Signature: JULITO Pearce/L 2024 2:33 PM

## 2024-06-19 ENCOUNTER — HOSPITAL ENCOUNTER (OUTPATIENT)
Dept: OCCUPATIONAL THERAPY | Age: 54
Setting detail: THERAPIES SERIES
Discharge: HOME | End: 2024-06-19
Payer: COMMERCIAL

## 2024-06-19 PROCEDURE — 97530 THERAPEUTIC ACTIVITIES: CPT

## 2024-06-19 NOTE — PROGRESS NOTES
[facility]       Occupational Therapy: Daily Note   Patient: Yenny Avalos (53 y.o. female)   Date: 2024  Plan of Care Certification Period:  24   :  1970  MRN: [id number]  CSN: [id number]   Insurance: Payor: [facility] / Plan: [employer] [place] MEDICA[place] / Product Type: *No Product type* /   Insurance ID: [id number] - (Medicaid Managed) Secondary Insurance (if applicable):    Referring Physician: Cristobal Thompson PA     PCP: Chad Crum MD Visits to Date: Total # of Visits to Date: 9 (plus evaluation)   Progress note:Progress Note Counter:   Visits Approved: 12    No Show: 3  Cancelled Appts:1     Medical Diagnosis: Closed displaced fracture of head of right radius with routine healing, subsequent encounter [S52.121D] Closed displaced fracture of head of right radius        Therapy Time     Time in 1105   Time out 1200   Total treatment minutes 55   Total time code minutes  55 Minutes        OT Therapeutic activities 55 minutes for 4 unit(s), CPT 79450       SUBJECTIVE EXAMINATION     Patient's date of birth confirmed: Yes     Pain Level:   with movement 2/10, at rest 0/10  Location: anterior surface of elbow   Description: dull, throb    Patient Comments:   \"Work is challenging.\" 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 When pain occurs, it is in the forearm just distal of the elbow, volar surface.          Learning/Language barrier: no,        HEP/Strategies/Orthosis Compliance: Patient verbally confirmed compliant with HEP's. Pt stated she tries to do them when she can, but cannot do them on days she works d/t soreness and overuse of LUE.           Restrictions: Per OTR Wm, referring provider messaged OTR in-basket stating, \"her fracture is stable enough that she can withstand anything you throw at her.\"            OBJECTIVE EXAMINATION     TREATMENT     Focus of treatment was on the following:   decreasing pain

## 2024-06-20 ENCOUNTER — OFFICE VISIT (OUTPATIENT)
Dept: ORTHOPEDIC SURGERY | Age: 54
End: 2024-06-20

## 2024-06-20 VITALS
TEMPERATURE: 97.6 F | BODY MASS INDEX: 31.41 KG/M2 | WEIGHT: 184 LBS | OXYGEN SATURATION: 97 % | HEIGHT: 64 IN | HEART RATE: 99 BPM

## 2024-06-20 DIAGNOSIS — S52.121D CLOSED DISPLACED FRACTURE OF HEAD OF RIGHT RADIUS WITH ROUTINE HEALING, SUBSEQUENT ENCOUNTER: Primary | ICD-10-CM

## 2024-06-20 PROCEDURE — 99024 POSTOP FOLLOW-UP VISIT: CPT | Performed by: PHYSICIAN ASSISTANT

## 2024-06-20 ASSESSMENT — ENCOUNTER SYMPTOMS
GASTROINTESTINAL NEGATIVE: 1
EYES NEGATIVE: 1
RESPIRATORY NEGATIVE: 1

## 2024-06-20 NOTE — PROGRESS NOTES
Yenny Avalos (:  1970) is a 53 y.o. female,Established patient, here for evaluation of the following chief complaint(s):  Follow-up (Follow up of right elbow fracture. DOI: 2024. No pain at rest.)      Assessment & Plan   1. Closed displaced fracture of head of right radius with routine healing, subsequent encounter      Return if symptoms worsen or fail to improve.       Subjective   Is a 53-year-old right-hand-dominant female following up after radial head fracture sustained while roll[employer]y [employer] 2024.  Patient was initially casted.  She has attended 15 visits to occupational therapy.  She states her range of motion is improving and her pain is decreasing.  She continues to feel somewhat weaker.  She is back to work.  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        Review of Systems   Constitutional: Negative.    HENT: Negative.     Eyes: Negative.    Respiratory: Negative.     Gastrointestinal: Negative.    Genitourinary: Negative.    Musculoskeletal: Negative.    Psychiatric/Behavioral: Negative.            Objective   Physical Exam  Musculoskeletal:      Comments: Right elbow-no soft tissue swelling.  Extension is lacking 4 degrees, flexion to 122 degrees.  Radial head is nontender with palpation.  Decreased pronation about 10 degrees in comparison with the left, supination is decreased about 15 degrees in comparison with the left.  Sensation is intact distally to light touch.  Capillary refills brisk.     Explained to the patient that her fracture is healing.  I suggested she continue with occupational therapy trying to regain her remaining range of motion.  She may ice.  She may use topical anti-inflammatory creams.  She will follow-up with me as her symptoms dictate.       On this date 2024 I have spent 35 minutes reviewing previous notes, test results and face to face with the patient discussing the diagnosis and importance of compliance with the treatment plan as

## 2024-06-21 ENCOUNTER — HOSPITAL ENCOUNTER (OUTPATIENT)
Dept: OCCUPATIONAL THERAPY | Age: 54
Setting detail: THERAPIES SERIES
Discharge: HOME | End: 2024-06-21
Payer: COMMERCIAL

## 2024-06-21 NOTE — PROGRESS NOTES
Therapy                            Cancellation/No-show Note    Date: 2024  Patient Name: Yenny Avalos    : 1970  (53 y.o.)     MRN: [id number]    Account #: [id number]    No Show: 3  Canceled Appointment: 2    Comments:      For today's appointment patient:  [x]  Cancelled  []  Rescheduled appointment  []  No-show   []  Called pt to remind of next appointment     Reason given by patient:  []  Patient ill  []  Conflicting appointment  []  No transportation    []  Conflict with work  []  No reason given  [x]  Other:  pt not able to make it to apt    [x] Pt has future appointments scheduled, no follow up needed  [] Pt requests to be on hold.    Reason:   If > 2 weeks please discuss with therapist.  [] Therapist to call pt for follow up     Signature: JULITO Pearce/L 2024 3:13 PM

## 2024-06-26 ENCOUNTER — HOSPITAL ENCOUNTER (OUTPATIENT)
Dept: OCCUPATIONAL THERAPY | Age: 54
Setting detail: THERAPIES SERIES
Discharge: HOME | End: 2024-06-26
Payer: COMMERCIAL

## 2024-06-26 PROCEDURE — 97140 MANUAL THERAPY 1/> REGIONS: CPT

## 2024-06-26 PROCEDURE — 97530 THERAPEUTIC ACTIVITIES: CPT

## 2024-06-26 NOTE — PROGRESS NOTES
[facility]       Occupational Therapy: Daily Note   Patient: Yenny Avalos (53 y.o. female)   Date: 2024  Plan of Care Certification Period:  24   :  1970  MRN: [id number]  CSN: [id number]   Insurance: Payor: [facility] / Plan: [employer] [place] MED[place] / Product Type: *No Product type* /   Insurance ID: [id number] - (Medicaid Managed) Secondary Insurance (if applicable):    Referring Physician: Cristobal Thompson PA     PCP: Chad Crum MD Visits to Date: Total # of Visits to Date: 10 (plus evaluation)   Progress note:Progress Note Counter: 10/12  Visits Approved: 12    No Show: 3  Cancelled Appts:2     Medical Diagnosis: Closed displaced fracture of head of right radius with routine healing, subsequent encounter [S52.121D] Closed displaced fracture of head of right radius        Therapy Time     Time in 1504   Time out 1600   Total treatment minutes 56   Total time code minutes  56 Minutes        OT Therapeutic activities 46 minutes for 3 unit(s), CPT 84940  OT Manual therapy 10 minutes for 1 unit(s), CPT 20629      SUBJECTIVE EXAMINATION     Patient's date of birth confirmed: Yes     Pain Level:   Pt denies pain  Location: N/A  Description: N/A    Patient Comments:   \"I am exhausted after work today. My doctor didn't really tell me if anything was wrong with my back, just arthritis.          Learning/Language barrier: no,        HEP/Strategies/Orthosis Compliance:  Patient verbally confirmed compliant with HEP's          Restrictions: Per OTR Wm, referring provider messaged OTR in-basket stating, \"her fracture is stable enough that she can withstand anything you throw at her.            OBJECTIVE EXAMINATION       TREATMENT     Focus of treatment was on the following:   decreasing pain and improving RUE function while addressing pain management.       MFR/Manual:   Pt treated seated on chair. At the beginning of the session, provided pt with soft tissue mob of

## 2024-06-28 ENCOUNTER — HOSPITAL ENCOUNTER (OUTPATIENT)
Dept: OCCUPATIONAL THERAPY | Age: 54
Setting detail: THERAPIES SERIES
Discharge: HOME | End: 2024-06-28
Payer: COMMERCIAL

## 2024-06-28 PROCEDURE — 97530 THERAPEUTIC ACTIVITIES: CPT

## 2024-06-28 NOTE — PROGRESS NOTES
[facility]       Occupational Therapy: Daily Note   Patient: Yenny Avalos (53 y.o. female)   Date: 2024  Plan of Care Certification Period:  24   :  1970  MRN: [id number]  CSN: [id number]   Insurance: Payor: [employer] / Plan: [employer] [place] / Product Type: *No Product type* /   Insurance ID: [id number] - (Medicaid Managed) Secondary Insurance (if applicable):    Referring Physician: Cristobal Thompson PA     PCP: Chad Crum MD Visits to Date: Total # of Visits to Date: 11 (plus evaluation)   Progress note:Progress Note Counter:   Visits Approved: 12    No Show: 3  Cancelled Appts:2     Medical Diagnosis: Closed displaced fracture of head of right radius with routine healing, subsequent encounter [S52.121D] Closed displaced fracture of head of right radius        Therapy Time     Time in 1510   Time out 1538   Total treatment minutes 28   Total time code minutes  28 Minutes        OT Therapeutic activities 28 minutes for 2 unit(s), CPT 87966    Pt arrived 10 minutes late to therapy and requested to end early d/t tiredness.       SUBJECTIVE EXAMINATION     Patient's date of birth confirmed: Yes     Pain Level:   Pt denies pain    Patient Comments:   Pt reports pain w/ supination, elbow flexion. Pt reports 4-5/10 pain while at work. Pt reports general improvement w/ pain and mobility as time has progressed.       Learning/Language barrier: no       HEP/Strategies/Orthosis Compliance: Patient verbally confirmed compliant with HEP's          Restrictions: Per OTR Wm, referring provider messaged OTR in-basket stating, \"her fracture is stable enough that she can withstand anything you throw at her.\"            OBJECTIVE EXAMINATION         TREATMENT     Focus of treatment was on the following:   assess for progress toward goals and education/training     Pt due for progress note today. Pt reassessed for progress towards goals as follows:    Quickdash: 13 
symmetric

## 2024-07-09 ENCOUNTER — HOSPITAL ENCOUNTER (OUTPATIENT)
Dept: OCCUPATIONAL THERAPY | Age: 54
Setting detail: THERAPIES SERIES
Discharge: HOME | End: 2024-07-09

## 2024-07-09 NOTE — PROGRESS NOTES
Therapy                            Cancellation/No-show Note    Date: 2024  Patient Name: Yenny Avalos    : 1970  (53 y.o.)     MRN: [id number]    Account #: [id number]    No Show: 4  Canceled Appointment: 2    Comments:      For today's appointment patient:  []  Cancelled  []  Rescheduled appointment  [x]  No-show   [x]  Called pt, left a VM to call back to schedule one more visit to finish current POC or consider possible D/C from OP OT.      Reason given by patient:  []  Patient ill  []  Conflicting appointment  []  No transportation    []  Conflict with work  []  No reason given  []  Other:      [] Pt has future appointments scheduled, no follow up needed  [] Pt requests to be on hold.    Reason:   If > 2 weeks please discuss with therapist.  [] Therapist to call pt for follow up     Signature: JLUIS Harrington 2024 4:22 PM

## 2024-07-24 ENCOUNTER — CLINICAL DOCUMENTATION (OUTPATIENT)
Dept: OCCUPATIONAL THERAPY | Age: 54
End: 2024-07-24

## 2024-07-24 NOTE — PROGRESS NOTES
No daily/progress note.    Came upon pt chart while reviewing caseload. Pt last seen on 06/28/2024. No show last scheduled appointment on 07/09/2024. Left VM on 07/09/24 about scheduling one more visit to finish POC.   Called and left pt a VM today, 07/24/2024, with another reminder. Encouraged to call back before 07/28/2024 which will be 30 days since pt was last treated, resulting in automatic D/C d/t non-compliance with attendance policy.     Electronically signed by JLUIS Harrington on 7/24/2024 at 11:49 AM

## 2024-07-29 ENCOUNTER — CLINICAL DOCUMENTATION (OUTPATIENT)
Dept: OCCUPATIONAL THERAPY | Age: 54
End: 2024-07-29

## 2024-07-29 NOTE — PROGRESS NOTES
OCCUPATIONAL THERAPY DISCHARGE SUMMARY   [facility]   [facility] [address]  Dept: [phone]  Dept Fax: [phone]  Loc: [phone]       Patient: Yenny Avalos (53 y.o. female)   Date: 2024   :  1970  MRN: [id number]  CSN: [id number]  Account #:    Insurance: Payor: [facility] / Plan: [facility] MEDICAID / Product Type: *No Product type* /   Insurance ID: [id number] - (Medicaid Managed) Secondary Insurance (if applicable):    Referring Physician: No ref. provider found     PCP: Chad Crum MD  Date of eval: 2024 Visits to Date: Total # of Visits to Date: 11 (plus evaluation)  Progress note:Progress Note Counter:   Visits Approved: 12    No Show: 4  Cancelled Appts:2     Medical Diagnosis: No admission diagnoses are documented for this encounter. Closed displaced fracture of head of right radius        Treating diagnosis: R29.898 Other symptoms and signs involving the musculoskeletal systems (decreased AROM, dexterity, and strength )      Comments: Patient being unexpectedly d/c from OT: 30 days since last OT treatment.. Patient last seen on 2024 Patient to be discharged from therapy secondary to non-compliance with attendance policy Patient has not returned phone calls to schedule more appointments.    Assessment:    Goals Current/Discharge status based on assessment/clinical notes from 2024 Met   Long Term Goal 1: Patient will report pain 4/10 or less during functional activities. Pt reports max 4-5/10 pain when completing daily activities.    [] Met  [x] Partially Met  [] Not Met   Long Term Goal 2: Patient  will be IND with all recommended HEP's, adaptive strategies, and adaptive techniques. Pt reports completing HEP INDEP. Pt has therasponge, theraband and other AROM exercises for strengthening. Fair carry over of pain management techniques and proper positioning for work tasks.

## 2025-02-17 ENCOUNTER — HOSPITAL ENCOUNTER (EMERGENCY)
Age: 55
Discharge: HOME | End: 2025-02-17
Payer: COMMERCIAL

## 2025-02-17 ENCOUNTER — APPOINTMENT (OUTPATIENT)
Dept: GENERAL RADIOLOGY | Age: 55
End: 2025-02-17
Payer: COMMERCIAL

## 2025-02-17 VITALS
SYSTOLIC BLOOD PRESSURE: 130 MMHG | HEART RATE: 80 BPM | OXYGEN SATURATION: 100 % | WEIGHT: 186.8 LBS | TEMPERATURE: 98.1 F | DIASTOLIC BLOOD PRESSURE: 85 MMHG | HEIGHT: 64 IN | BODY MASS INDEX: 31.89 KG/M2 | RESPIRATION RATE: 22 BRPM

## 2025-02-17 DIAGNOSIS — T14.8XXA MUSCLE STRAIN: Primary | ICD-10-CM

## 2025-02-17 DIAGNOSIS — J10.1 INFLUENZA A: ICD-10-CM

## 2025-02-17 DIAGNOSIS — E87.6 HYPOKALEMIA: ICD-10-CM

## 2025-02-17 LAB
ALBUMIN SERPL-MCNC: 4 G/DL (ref 3.5–4.6)
ALP SERPL-CCNC: 88 U/L (ref 40–130)
ALT SERPL-CCNC: 19 U/L (ref 0–33)
ANION GAP SERPL CALCULATED.3IONS-SCNC: 12 MEQ/L (ref 9–15)
AST SERPL-CCNC: 21 U/L (ref 0–35)
BASOPHILS # BLD: 0 K/UL (ref 0–0.2)
BASOPHILS NFR BLD: 0.1 %
BILIRUB SERPL-MCNC: <0.2 MG/DL (ref 0.2–0.7)
BUN SERPL-MCNC: 14 MG/DL (ref 6–20)
CALCIUM SERPL-MCNC: 8.9 MG/DL (ref 8.5–9.9)
CHLORIDE SERPL-SCNC: 103 MEQ/L (ref 95–107)
CO2 SERPL-SCNC: 24 MEQ/L (ref 20–31)
CREAT SERPL-MCNC: 0.76 MG/DL (ref 0.5–0.9)
EKG ATRIAL RATE: 76 BPM
EKG P AXIS: 24 DEGREES
EKG P-R INTERVAL: 164 MS
EKG Q-T INTERVAL: 412 MS
EKG QRS DURATION: 86 MS
EKG QTC CALCULATION (BAZETT): 463 MS
EKG R AXIS: 0 DEGREES
EKG T AXIS: -8 DEGREES
EKG VENTRICULAR RATE: 76 BPM
EOSINOPHIL # BLD: 0 K/UL (ref 0–0.7)
EOSINOPHIL NFR BLD: 0 %
ERYTHROCYTE [DISTWIDTH] IN BLOOD BY AUTOMATED COUNT: 13.2 % (ref 11.5–14.5)
GLOBULIN SER CALC-MCNC: 3.1 G/DL (ref 2.3–3.5)
GLUCOSE SERPL-MCNC: 100 MG/DL (ref 70–99)
HCT VFR BLD AUTO: 38.1 % (ref 37–47)
HGB BLD-MCNC: 12.9 G/DL (ref 12–16)
LIPASE SERPL-CCNC: 17 U/L (ref 12–95)
LYMPHOCYTES # BLD: 2.4 K/UL (ref 1–4.8)
LYMPHOCYTES NFR BLD: 33 %
MAGNESIUM SERPL-MCNC: 2.1 MG/DL (ref 1.7–2.4)
MCH RBC QN AUTO: 30.4 PG (ref 27–31.3)
MCHC RBC AUTO-ENTMCNC: 33.9 % (ref 33–37)
MCV RBC AUTO: 89.6 FL (ref 79.4–94.8)
MONOCYTES # BLD: 0.9 K/UL (ref 0.2–0.8)
MONOCYTES NFR BLD: 12.2 %
NEUTROPHILS # BLD: 3.9 K/UL (ref 1.4–6.5)
NEUTS SEG NFR BLD: 54.4 %
PLATELET # BLD AUTO: 211 K/UL (ref 130–400)
POTASSIUM SERPL-SCNC: 2.7 MEQ/L (ref 3.4–4.9)
PROT SERPL-MCNC: 7.1 G/DL (ref 6.3–8)
RBC # BLD AUTO: 4.25 M/UL (ref 4.2–5.4)
SODIUM SERPL-SCNC: 139 MEQ/L (ref 135–144)
TROPONIN, HIGH SENSITIVITY: <6 NG/L (ref 0–19)
WBC # BLD AUTO: 7.2 K/UL (ref 4.8–10.8)

## 2025-02-17 PROCEDURE — 71045 X-RAY EXAM CHEST 1 VIEW: CPT

## 2025-02-17 PROCEDURE — 6360000002 HC RX W HCPCS: Performed by: PERSONAL EMERGENCY RESPONSE ATTENDANT

## 2025-02-17 PROCEDURE — 93005 ELECTROCARDIOGRAM TRACING: CPT | Performed by: PERSONAL EMERGENCY RESPONSE ATTENDANT

## 2025-02-17 PROCEDURE — 6370000000 HC RX 637 (ALT 250 FOR IP): Performed by: PERSONAL EMERGENCY RESPONSE ATTENDANT

## 2025-02-17 PROCEDURE — 99285 EMERGENCY DEPT VISIT HI MDM: CPT

## 2025-02-17 PROCEDURE — 94640 AIRWAY INHALATION TREATMENT: CPT

## 2025-02-17 PROCEDURE — 74018 RADEX ABDOMEN 1 VIEW: CPT

## 2025-02-17 PROCEDURE — 80053 COMPREHEN METABOLIC PANEL: CPT

## 2025-02-17 PROCEDURE — 83735 ASSAY OF MAGNESIUM: CPT

## 2025-02-17 PROCEDURE — 94761 N-INVAS EAR/PLS OXIMETRY MLT: CPT

## 2025-02-17 PROCEDURE — 96374 THER/PROPH/DIAG INJ IV PUSH: CPT

## 2025-02-17 PROCEDURE — 36415 COLL VENOUS BLD VENIPUNCTURE: CPT

## 2025-02-17 PROCEDURE — 85025 COMPLETE CBC W/AUTO DIFF WBC: CPT

## 2025-02-17 PROCEDURE — 84484 ASSAY OF TROPONIN QUANT: CPT

## 2025-02-17 PROCEDURE — 93010 ELECTROCARDIOGRAM REPORT: CPT | Performed by: INTERNAL MEDICINE

## 2025-02-17 PROCEDURE — 83690 ASSAY OF LIPASE: CPT

## 2025-02-17 RX ORDER — METHOCARBAMOL 750 MG/1
750 TABLET, FILM COATED ORAL 4 TIMES DAILY
Qty: 20 TABLET | Refills: 0 | Status: SHIPPED | OUTPATIENT
Start: 2025-02-17 | End: 2025-02-22

## 2025-02-17 RX ORDER — POTASSIUM CHLORIDE 1500 MG/1
20 TABLET, EXTENDED RELEASE ORAL DAILY
Qty: 4 TABLET | Refills: 0 | Status: SHIPPED | OUTPATIENT
Start: 2025-02-17 | End: 2025-02-21

## 2025-02-17 RX ORDER — KETOROLAC TROMETHAMINE 30 MG/ML
30 INJECTION, SOLUTION INTRAMUSCULAR; INTRAVENOUS ONCE
Status: COMPLETED | OUTPATIENT
Start: 2025-02-17 | End: 2025-02-17

## 2025-02-17 RX ORDER — POTASSIUM CHLORIDE 1500 MG/1
40 TABLET, EXTENDED RELEASE ORAL ONCE
Status: COMPLETED | OUTPATIENT
Start: 2025-02-17 | End: 2025-02-17

## 2025-02-17 RX ORDER — ALBUTEROL SULFATE 90 UG/1
2 INHALANT RESPIRATORY (INHALATION) 4 TIMES DAILY PRN
Qty: 54 G | Refills: 0 | Status: SHIPPED | OUTPATIENT
Start: 2025-02-17

## 2025-02-17 RX ORDER — IPRATROPIUM BROMIDE AND ALBUTEROL SULFATE 2.5; .5 MG/3ML; MG/3ML
2 SOLUTION RESPIRATORY (INHALATION) CONTINUOUS PRN
Status: DISCONTINUED | OUTPATIENT
Start: 2025-02-17 | End: 2025-02-17 | Stop reason: HOSPADM

## 2025-02-17 RX ADMIN — POTASSIUM BICARBONATE 25 MEQ: 978 TABLET, EFFERVESCENT ORAL at 10:04

## 2025-02-17 RX ADMIN — POTASSIUM CHLORIDE 40 MEQ: 1500 TABLET, EXTENDED RELEASE ORAL at 10:03

## 2025-02-17 RX ADMIN — KETOROLAC TROMETHAMINE 30 MG: 30 INJECTION, SOLUTION INTRAMUSCULAR at 09:23

## 2025-02-17 RX ADMIN — IPRATROPIUM BROMIDE AND ALBUTEROL SULFATE 2 DOSE: .5; 2.5 SOLUTION RESPIRATORY (INHALATION) at 08:28

## 2025-02-17 ASSESSMENT — PAIN DESCRIPTION - ORIENTATION
ORIENTATION: RIGHT;UPPER
ORIENTATION: UPPER

## 2025-02-17 ASSESSMENT — LIFESTYLE VARIABLES
HOW OFTEN DO YOU HAVE A DRINK CONTAINING ALCOHOL: NEVER
HOW MANY STANDARD DRINKS CONTAINING ALCOHOL DO YOU HAVE ON A TYPICAL DAY: PATIENT DOES NOT DRINK

## 2025-02-17 ASSESSMENT — ENCOUNTER SYMPTOMS
SHORTNESS OF BREATH: 0
COUGH: 1
SORE THROAT: 0
BLOOD IN STOOL: 0
DIARRHEA: 0
NAUSEA: 1
VOMITING: 1
RHINORRHEA: 0
COLOR CHANGE: 0
ABDOMINAL PAIN: 1

## 2025-02-17 ASSESSMENT — PAIN SCALES - GENERAL
PAINLEVEL_OUTOF10: 6
PAINLEVEL_OUTOF10: 7
PAINLEVEL_OUTOF10: 4

## 2025-02-17 ASSESSMENT — PAIN DESCRIPTION - LOCATION
LOCATION: ABDOMEN
LOCATION: ABDOMEN

## 2025-02-17 ASSESSMENT — PAIN DESCRIPTION - DESCRIPTORS
DESCRIPTORS: DISCOMFORT
DESCRIPTORS: SORE

## 2025-02-17 ASSESSMENT — PAIN - FUNCTIONAL ASSESSMENT: PAIN_FUNCTIONAL_ASSESSMENT: 0-10

## 2025-02-17 NOTE — ED PROVIDER NOTES
[facility] EMERGENCY DEPARTMENT  eMERGENCY dEPARTMENT eNCOUnter      Pt Name: Yenny Avalos  MRN: [id number]  Birthdate 1970  Date of evaluation: 2/17/2025  Provider: CLAY TAMAYO  8:12 AM EST     My attending is Dr. Frederick    HISTORY OF PRESENT ILLNESS    Yenny Avalos is a 54 y.o. female with PMHx of anemia, hyperlipidemia presents to the emergency department with RUQ abdominal pain.  4 days ago patient started with runny nose, nasal congestion, chest congestion.  3 days ago started with right upper quadrant abdominal pain, worse with coughing, described as pinching, improves leaning forward.  Seen at urgent care 2 days ago with positive flu A (negative flu B and COVID).  Sent home with Motrin, Bromfed, Tamiflu.  Yesterday she had 1 episode of emesis.  Decreased appetite with last bowel movement multiple days ago.  She denies fevers, shortness of breath, diarrhea, urinary symptoms, leg swelling      HPI    Nursing Notes were reviewed.    REVIEW OF SYSTEMS       Review of Systems   Constitutional:  Positive for appetite change. Negative for chills and fever.   HENT:  Positive for congestion. Negative for rhinorrhea and sore throat.    Respiratory:  Positive for cough. Negative for shortness of breath.    Cardiovascular:  Negative for chest pain.   Gastrointestinal:  Positive for abdominal pain, nausea and vomiting. Negative for blood in stool and diarrhea.   Genitourinary:  Negative for difficulty urinating.   Musculoskeletal:  Negative for neck stiffness.   Skin:  Negative for color change and rash.   Neurological:  Negative for dizziness, syncope, weakness, light-headedness, numbness and headaches.   All other systems reviewed and are negative.            PAST MEDICAL HISTORY     Past Medical History:   Diagnosis Date    Anemia     Hyperlipidemia          SURGICAL HISTORY     No past surgical history on file.      CURRENT MEDICATIONS       Previous Medications    No medications on file       ALLERGIES